# Patient Record
(demographics unavailable — no encounter records)

---

## 2021-10-04 NOTE — US
Right lower extremity deep venous ultrasound: Duplex and color Doppler

 evaluation was obtained of the right common femoral, proximal greater

 saphenous, superficial femoral, popliteal, posterior tibial and 

peroneal veins.  Left common femoral vein was also evaluated.

 

Comparison: No prior venous imaging is available.

 

Findings: Visualized venous structures show normal compression, 

augmentation and phasic flow.  Additional imaging was obtained in the 

area of swelling to the lateral right ankle which shows no cyst or 

solid abnormality.

 

Impression:

1.  No findings of deep venous thrombosis within the right lower 

extremity or within the left common femoral vein.

 

Diagnostic code #1

## 2021-10-04 NOTE — EDM.PDOC
ED HPI GENERAL MEDICAL PROBLEM





- General


Chief Complaint: Lower Extremity Injury/Pain


Stated Complaint: COVID +/POSS BLOOD CLOT


Time Seen by Provider: 10/04/21 19:27


Source of Information: Reports: Patient


History Limitations: Reports: No Limitations





- History of Present Illness


INITIAL COMMENTS - FREE TEXT/NARRATIVE: 





27-year-old female presents the emergency department this evening with 

complaints of right leg pain.  Patient states she was diagnosed with Covid 3 

days ago however developed symptoms approximately 7 days ago.  She states her 

symptoms have been minimal however she developed right lower extremity achiness.

 She states that today she noticed reddened area to her right lateral lower leg 

that has become more painful.  She states the pain radiates up her right lateral

lower leg and into the hip area.  She is concerned she may have a blood clot.


  ** Right Lower Leg


Pain Score (Numeric/FACES): 6





- Related Data


                                    Allergies











Allergy/AdvReac Type Severity Reaction Status Date / Time


 


venom-honey bee Allergy Severe Swelling Verified 10/04/21 18:54





[bee venom (honey bee)]     











Home Meds: 


                                    Home Meds





Multivitamin 1 tab PO DAILY 10/04/21 [History]











Past Medical History





- Past Health History


Medical/Surgical History: Denies Medical/Surgical History


HEENT History: Reports: Impaired Vision


Other HEENT History: Wears glasses.





- Infectious Disease History


Infectious Disease History: Reports: Chicken Pox, Influenza, Novel Coronavirus





- Past Surgical History


HEENT Surgical History: Reports: Naso-Sinus Surgery, Oral Surgery


Other HEENT Surgeries/Procedures: [place] tooth extraction.





Social & Family History





- Family History


Family Medical History: No Pertinent Family History





- Tobacco Use


Tobacco Use Status *Q: Never Tobacco User





- Caffeine Use


Caffeine Use: Reports: Coffee, Soda





- Recreational Drug Use


Recreational Drug Use: No





- Living Situation & Occupation


Living situation: Reports: , with Spouse, with Family (2 kids)


Occupation: Employed (Teacher in [place])





Review of Systems





- Review of Systems


Review Of Systems: Comprehensive ROS is negative, except as noted in HPI.





ED EXAM, GENERAL





- Physical Exam


Exam: See Below


Exam Limited By: No Limitations


General Appearance: Alert, WD/WN, No Apparent Distress


Ears: Normal External Exam, Hearing Grossly Normal


Nose: Normal Inspection


Throat/Mouth: Normal Inspection, Normal Lips, Normal Voice, No Airway Compromise


Head: Atraumatic


Neck: Normal Inspection, Supple


Respiratory/Chest: No Respiratory Distress, Lungs Clear, Normal Breath Sounds, 

No Accessory Muscle Use, Chest Non-Tender


Cardiovascular: Normal Peripheral Pulses, Regular Rate, Rhythm, No Edema, No 

Murmur


Peripheral Pulses: 2+: Radial (L), Radial (R)


GI/Abdominal: Normal Bowel Sounds, Soft, Non-Tender, No Distention


 (Female) Exam: Deferred


Rectal (Female) Exam: Deferred


Back Exam: Normal Inspection


Extremities: Normal Range of Motion, No Pedal Edema, Normal Capillary Refill, 

Increased Warmth (Right lateral lower leg; size is approximately that of a $0.50

 piece.;  There is erythema and edema also noted with this area.).  No: Non-

Tender (Right lateral lower extremity)


Neurological: Alert, Oriented, Normal Cognition


Psychiatric: Normal Affect, Normal Mood


Skin Exam: Warm, Dry, Intact, Normal Color, No Rash


Lymphatic: No Adenopathy





Course





- Vital Signs


Text/Narrative:: 





As stated above, patient presents with pain to the right lower extremity after 

being diagnosed with Covid 3 days ago.  She is concerned that she may have a 

blood clot.  Upon exam, the patient does have a area of erythema and edema noted

 to her lateral right lower leg.  It is approximately the size of a $0.50 piece.

  The area is hot to touch.  She states it is tender with palpation.  She states

 that the pain does radiate up the right lateral portion of her lower leg.  She 

has denied any shortness of breath or cough.  She states that initially when she

 started having Covid symptoms that she had fatigue, fever chills and diarrhea 

however most of that has resolved.  I have ordered an ultrasound of the right 

lower extremity to rule out DVT.


Last Recorded V/S: 


                                Last Vital Signs











Temp  98.1 F   10/04/21 18:58


 


Pulse  79   10/04/21 18:58


 


Resp  20   10/04/21 18:58


 


BP  111/85   10/04/21 18:58


 


Pulse Ox  99   10/04/21 18:58














- Re-Assessments/Exams


Free Text/Narrative Re-Assessment/Exam: 





10/04/21 21:50


Radiologist impression right lower extremity deep venous ultrasound: 1.  No 

findings of deep venous thrombosis within the right lower extremity or within 

the left common femoral vein.





Patient will be discharged home.





Departure





- Departure


Time of Disposition: 21:50


Disposition: Home, Self-Care 01


Condition: Good


Clinical Impression: 


 Leg pain, right








- Discharge Information


Instructions:  Pain Medicine Instructions, Easy-to-Read


Referrals: 


PCP,None [Primary Care Provider] - 


Forms:  ED Department Discharge


Additional Instructions: 


You were seen in the emergency department with complaints of pain to your right 

leg with an area of redness noted to the right lateral lower leg.  Concern was 

for blood clot to the lower extremity.  Ultrasound was completed and this was 

negative for a blood clot.  Recommend that you go home and rest.  Drink plenty 

of fluids.  May take Tylenol 650 mg every 4 hours as needed for discomfort or 

ibuprofen 600 mg every 6-8 hours as needed for discomfort.  Should your 

condition worsen or change, do not hesitate returning to the emergency 

department.





Sepsis Event Note (ED)





- Focused Exam


Vital Signs: 


                                   Vital Signs











  Temp Pulse Resp BP Pulse Ox


 


 10/04/21 18:58  98.1 F  79  20  111/85  99 Refill request    clonazePAM (KLONOPIN) 0.5 MG tablet    [facility] [place]